# Patient Record
Sex: MALE | Race: OTHER | URBAN - METROPOLITAN AREA
[De-identification: names, ages, dates, MRNs, and addresses within clinical notes are randomized per-mention and may not be internally consistent; named-entity substitution may affect disease eponyms.]

---

## 2017-05-12 ENCOUNTER — IMPORTED ENCOUNTER (OUTPATIENT)
Dept: URBAN - METROPOLITAN AREA CLINIC 50 | Facility: CLINIC | Age: 82
End: 2017-05-12

## 2017-06-05 ENCOUNTER — IMPORTED ENCOUNTER (OUTPATIENT)
Dept: URBAN - METROPOLITAN AREA CLINIC 50 | Facility: CLINIC | Age: 82
End: 2017-06-05

## 2017-06-08 ENCOUNTER — IMPORTED ENCOUNTER (OUTPATIENT)
Dept: URBAN - METROPOLITAN AREA CLINIC 50 | Facility: CLINIC | Age: 82
End: 2017-06-08

## 2017-06-14 ENCOUNTER — IMPORTED ENCOUNTER (OUTPATIENT)
Dept: URBAN - METROPOLITAN AREA CLINIC 50 | Facility: CLINIC | Age: 82
End: 2017-06-14

## 2017-06-29 ENCOUNTER — IMPORTED ENCOUNTER (OUTPATIENT)
Dept: URBAN - METROPOLITAN AREA CLINIC 50 | Facility: CLINIC | Age: 82
End: 2017-06-29

## 2017-07-31 ENCOUNTER — IMPORTED ENCOUNTER (OUTPATIENT)
Dept: URBAN - METROPOLITAN AREA CLINIC 50 | Facility: CLINIC | Age: 82
End: 2017-07-31

## 2017-08-14 ENCOUNTER — IMPORTED ENCOUNTER (OUTPATIENT)
Dept: URBAN - METROPOLITAN AREA CLINIC 50 | Facility: CLINIC | Age: 82
End: 2017-08-14

## 2018-04-09 ENCOUNTER — IMPORTED ENCOUNTER (OUTPATIENT)
Dept: URBAN - METROPOLITAN AREA CLINIC 50 | Facility: CLINIC | Age: 83
End: 2018-04-09

## 2019-04-08 ENCOUNTER — IMPORTED ENCOUNTER (OUTPATIENT)
Dept: URBAN - METROPOLITAN AREA CLINIC 50 | Facility: CLINIC | Age: 84
End: 2019-04-08

## 2020-06-11 ENCOUNTER — IMPORTED ENCOUNTER (OUTPATIENT)
Dept: URBAN - METROPOLITAN AREA CLINIC 50 | Facility: CLINIC | Age: 85
End: 2020-06-11

## 2021-01-21 NOTE — PATIENT DISCUSSION
PA TID OU for 2 weeks and AT TID OU PF for 2 weeks. Disc cold compresses BID. Poss Pataday as well. Significant allergy today.

## 2021-02-04 NOTE — PATIENT DISCUSSION
Gave samples of Cequa BID OU to use and will call for offical rx if helping. continue the help the steroid has given. Systance PRN OU, gel at night.

## 2021-02-04 NOTE — PATIENT DISCUSSION
Gave sample of Durezol qam OU for 10 days then stop. Will taper and cont with Pataday for longterm use.

## 2021-04-18 ASSESSMENT — VISUAL ACUITY
OD_SC: 20/30-
OD_SC: 20/40+
OS_SC: 20/25-
OD_SC: 20/40+2
OD_CC: J1+
OD_PH: 20/30-
OD_SC: 20/40
OS_OTHER: 20/60. 20/80.
OS_SC: 20/30-1
OS_CC: J1+
OD_SC: 20/30
OS_CC: J1+@ 13 IN
OD_OTHER: 20/40. 20/80.
OS_SC: 20/25
OS_SC: 20/40
OD_BAT: 20/40
OD_CC: J1+@ 13 IN
OS_SC: 20/25
OS_CC: J1
OS_SC: 20/30
OD_SC: 20/40
OD_CC: J1
OS_BAT: 20/60
OS_BAT: 20/30
OD_BAT: 20/40
OD_SC: 20/30
OD_OTHER: 20/40. 20/80.
OS_OTHER: 20/30. 20/30.
OS_SC: 20/30-

## 2021-04-18 ASSESSMENT — TONOMETRY
OS_IOP_MMHG: 18
OD_IOP_MMHG: 19
OS_IOP_MMHG: 18
OD_IOP_MMHG: 17
OD_IOP_MMHG: 13
OD_IOP_MMHG: 17
OS_IOP_MMHG: 14
OD_IOP_MMHG: 16
OS_IOP_MMHG: 17
OS_IOP_MMHG: 19
OS_IOP_MMHG: 18
OD_IOP_MMHG: 12

## 2021-06-04 ENCOUNTER — IMPORTED ENCOUNTER (OUTPATIENT)
Dept: URBAN - METROPOLITAN AREA CLINIC 53 | Facility: CLINIC | Age: 86
End: 2021-06-04

## 2021-06-07 ENCOUNTER — PREPPED CHART (OUTPATIENT)
Dept: URBAN - METROPOLITAN AREA CLINIC 53 | Facility: CLINIC | Age: 86
End: 2021-06-07

## 2021-07-08 ENCOUNTER — ANNUAL COMPREHENSIVE EXAM (OUTPATIENT)
Dept: URBAN - METROPOLITAN AREA CLINIC 53 | Facility: CLINIC | Age: 86
End: 2021-07-08

## 2021-07-08 DIAGNOSIS — H35.361: ICD-10-CM

## 2021-07-08 DIAGNOSIS — H26.491: ICD-10-CM

## 2021-07-08 PROCEDURE — 92134 CPTRZ OPH DX IMG PST SGM RTA: CPT

## 2021-07-08 PROCEDURE — 92014 COMPRE OPH EXAM EST PT 1/>: CPT

## 2021-07-08 ASSESSMENT — VISUAL ACUITY
OS_GLARE: 20/30
OS_GLARE: 20/30
OD_PH: 20/30
OD_SC: 20/50
OD_GLARE: 20/40
OS_SC: 20/30-1
OD_GLARE: 20/40
OU_SC: J1

## 2021-07-08 ASSESSMENT — TONOMETRY
OD_IOP_MMHG: 17
OS_IOP_MMHG: 18

## 2021-10-06 NOTE — PROCEDURE NOTE: CLINICAL
PROCEDURE NOTE: Punctal Plugs, Leroy Krishnamurthy ([id number], [id number]) #1 Left Lower Lid. Prior to treatment, the risks/benefits/alternatives were discussed. The patient wished to proceed with procedure. Temporary collagen plugs were inserted. Patient tolerated procedure well. There were no complications. Post procedure instructions given. Suzi Giles PROCEDURE NOTE: Punctal Plugs, Adamariss Dissolvable ([id number], [id number]) #1 Right Lower Lid. Prior to treatment, the risks/benefits/alternatives were discussed. The patient wished to proceed with procedure. Temporary collagen plugs were inserted. Patient tolerated procedure well. There were no complications. Post procedure instructions given. Suzi Giles

## 2022-04-08 NOTE — PATIENT DISCUSSION
BLL PLUGS TODAY. DISC WITH PT THAT POSS NEED TO TRY SERUM TEARS IF CEQUA DOES NOT GIVE ENOUGH COMFORT. CONT WITH CEQUA BID OU. NP TEARS AS NEEDED.

## 2022-04-08 NOTE — PROCEDURE NOTE: CLINICAL
PROCEDURE NOTE: Punctal Plugs, Arbinta Yunieranson ([id number], [id number]) #1 Left Lower Lid. Prior to treatment, the risks/benefits/alternatives were discussed. The patient wished to proceed with procedure. Temporary collagen plugs were inserted. Patient tolerated procedure well. There were no complications. Post procedure instructions given. Mey Cutting PROCEDURE NOTE: Punctal Plugs, Adamariss Dissolvable ([id number], [id number]) #1 Right Lower Lid. Prior to treatment, the risks/benefits/alternatives were discussed. The patient wished to proceed with procedure. Temporary collagen plugs were inserted. Patient tolerated procedure well. There were no complications. Post procedure instructions given. Mey Miner

## 2022-07-07 ENCOUNTER — COMPREHENSIVE EXAM (OUTPATIENT)
Dept: URBAN - METROPOLITAN AREA CLINIC 53 | Facility: CLINIC | Age: 87
End: 2022-07-07

## 2022-07-07 DIAGNOSIS — H26.491: ICD-10-CM

## 2022-07-07 DIAGNOSIS — H35.361: ICD-10-CM

## 2022-07-07 PROCEDURE — 92134 CPTRZ OPH DX IMG PST SGM RTA: CPT

## 2022-07-07 PROCEDURE — 92014 COMPRE OPH EXAM EST PT 1/>: CPT

## 2022-07-07 ASSESSMENT — TONOMETRY
OD_IOP_MMHG: 16
OS_IOP_MMHG: 17

## 2022-07-07 ASSESSMENT — VISUAL ACUITY
OS_SC: 20/30-1
OS_GLARE: 20/40-1
OD_GLARE: 20/40
OD_SC: 20/25-1
OS_GLARE: 20/50
OD_GLARE: 20/25
OU_SC: J1+

## 2022-09-16 NOTE — PROCEDURE NOTE: CLINICAL
PROCEDURE NOTE: Punctal Plugs, Sylviatess Dissolvable ([id number], [id number]) #1 Right Lower Lid. Prior to treatment, the risks/benefits/alternatives were discussed. The patient wished to proceed with procedure. Temporary collagen plugs were inserted. Patient tolerated procedure well. There were no complications. Post procedure instructions given. Heber Valencia PROCEDURE NOTE: Punctal Plugs, Callum Davi ([id number], [id number]) #1 Left Lower Lid. Prior to treatment, the risks/benefits/alternatives were discussed. The patient wished to proceed with procedure. Temporary collagen plugs were inserted. Patient tolerated procedure well. There were no complications. Post procedure instructions given. Heber Valencia

## 2023-01-06 NOTE — PROCEDURE NOTE: CLINICAL
PROCEDURE NOTE: Punctal Plugs, Dissolvable #1 Left Lower Lid. Anesthesia: Proparacaine 0.5%. Prior to treatment, the risks/benefits/alternatives were discussed. The patient wished to proceed with procedure. 1 drop of Proparacaine 0.5% was instilled. Puncta was dilated with punctal dilator. Dissolvable punctal plugs were inserted. Size/location of plugs inserted: *. The patient tolerated the procedure well. There were no complications. Post procedure instructions given. Suzi Giles PROCEDURE NOTE: Punctal Plugs, Dissolvable #1 Right Lower Lid. Anesthesia: Proparacaine 0.5%. Prior to treatment, the risks/benefits/alternatives were discussed. The patient wished to proceed with procedure. 1 drop of Proparacaine 0.5% was instilled. Puncta was dilated with punctal dilator. Dissolvable punctal plugs were inserted. Size/location of plugs inserted: *. The patient tolerated the procedure well. There were no complications. Post procedure instructions given. Suzi Giles

## 2023-06-28 ENCOUNTER — COMPREHENSIVE EXAM (OUTPATIENT)
Dept: URBAN - METROPOLITAN AREA CLINIC 49 | Facility: CLINIC | Age: 88
End: 2023-06-28

## 2023-06-28 DIAGNOSIS — H43.813: ICD-10-CM

## 2023-06-28 DIAGNOSIS — H31.22: ICD-10-CM

## 2023-06-28 DIAGNOSIS — H26.491: ICD-10-CM

## 2023-06-28 DIAGNOSIS — H04.123: ICD-10-CM

## 2023-06-28 PROCEDURE — 92014 COMPRE OPH EXAM EST PT 1/>: CPT

## 2023-06-28 PROCEDURE — 92134 CPTRZ OPH DX IMG PST SGM RTA: CPT

## 2023-06-28 ASSESSMENT — KERATOMETRY
OS_AXISANGLE2_DEGREES: 166
OD_K2POWER_DIOPTERS: 44.00
OS_K2POWER_DIOPTERS: 42.50
OD_K1POWER_DIOPTERS: 41.25
OS_AXISANGLE_DEGREES: 76
OD_AXISANGLE_DEGREES: 2
OS_K1POWER_DIOPTERS: 41.50
OD_AXISANGLE2_DEGREES: 92

## 2023-06-28 ASSESSMENT — VISUAL ACUITY
OS_GLARE: 20/80
OS_SC: 20/60-1
OD_GLARE: 20/60
OD_GLARE: 20/60
OU_SC: J1@14
OD_SC: 20/40
OS_GLARE: 20/80

## 2023-06-28 ASSESSMENT — TONOMETRY
OS_IOP_MMHG: 16
OD_IOP_MMHG: 16

## 2023-11-27 ENCOUNTER — COMPREHENSIVE EXAM (OUTPATIENT)
Dept: URBAN - METROPOLITAN AREA CLINIC 53 | Facility: CLINIC | Age: 88
End: 2023-11-27

## 2023-11-27 DIAGNOSIS — H04.123: ICD-10-CM

## 2023-11-27 DIAGNOSIS — H26.491: ICD-10-CM

## 2023-11-27 DIAGNOSIS — H43.813: ICD-10-CM

## 2023-11-27 DIAGNOSIS — H52.4: ICD-10-CM

## 2023-11-27 PROCEDURE — 92015 DETERMINE REFRACTIVE STATE: CPT

## 2023-11-27 PROCEDURE — 92134 CPTRZ OPH DX IMG PST SGM RTA: CPT

## 2023-11-27 PROCEDURE — 92014 COMPRE OPH EXAM EST PT 1/>: CPT

## 2023-11-27 ASSESSMENT — VISUAL ACUITY
OS_GLARE: 20/70
OD_GLARE: 20/50
OS_GLARE: 20/60
OD_GLARE: 20/60
OD_PH: 20/30
OD_SC: 20/40-1

## 2023-11-27 ASSESSMENT — KERATOMETRY
OD_K2POWER_DIOPTERS: 44.00
OD_K1POWER_DIOPTERS: 41.25
OS_K1POWER_DIOPTERS: 41.50
OD_AXISANGLE_DEGREES: 2
OS_K2POWER_DIOPTERS: 42.50
OS_AXISANGLE_DEGREES: 76
OS_AXISANGLE2_DEGREES: 166
OD_AXISANGLE2_DEGREES: 92

## 2023-11-27 ASSESSMENT — TONOMETRY
OS_IOP_MMHG: 12
OD_IOP_MMHG: 10